# Patient Record
(demographics unavailable — no encounter records)

---

## 2025-01-06 NOTE — PHYSICAL EXAM
[Well-appearing] : well-appearing [Normocephalic] : normocephalic [No dysmorphic facial features] : no dysmorphic facial features [No ocular abnormalities] : no ocular abnormalities [Neck supple] : neck supple [Soft] : soft [No abnormal neurocutaneous stigmata or skin lesions] : no abnormal neurocutaneous stigmata or skin lesions [Straight] : straight [No deformities] : no deformities [Alert] : alert [Well related, good eye contact] : well related, good eye contact [Conversant] : conversant [Normal speech and language] : normal speech and language [Follows instructions well] : follows instructions well [VFF] : VFF [Pupils reactive to light and accommodation] : pupils reactive to light and accommodation [Full extraocular movements] : full extraocular movements [No nystagmus] : no nystagmus [Normal facial sensation to light touch] : normal facial sensation to light touch [No facial asymmetry or weakness] : no facial asymmetry or weakness [Gross hearing intact] : gross hearing intact [Equal palate elevation] : equal palate elevation [Good shoulder shrug] : good shoulder shrug [Normal tongue movement] : normal tongue movement [Midline tongue, no fasciculations] : midline tongue, no fasciculations [R handed] : R handed [Normal axial and appendicular muscle tone] : normal axial and appendicular muscle tone [Gets up on table without difficulty] : gets up on table without difficulty [No pronator drift] : no pronator drift [Normal finger tapping and fine finger movements] : normal finger tapping and fine finger movements [No abnormal involuntary movements] : no abnormal involuntary movements [5/5 strength in proximal and distal muscles of arms and legs] : 5/5 strength in proximal and distal muscles of arms and legs [Able to walk on heels] : able to walk on heels [Able to walk on toes] : able to walk on toes [2+ biceps] : 2+ biceps [Knee jerks] : knee jerks [Ankle jerks] : ankle jerks [No ankle clonus] : no ankle clonus [Good walking balance] : good walking balance [Normal gait] : normal gait [de-identified] : no resp distress, no retractions  [de-identified] : full ROM  [de-identified] : walks well [de-identified] : LT intact

## 2025-01-06 NOTE — REASON FOR VISIT
[Initial Consultation] : an initial consultation for [Parents] : parents [FreeTextEntry2] : toe-walking, cramping

## 2025-01-06 NOTE — PHYSICAL EXAM
[Well-appearing] : well-appearing [Normocephalic] : normocephalic [No dysmorphic facial features] : no dysmorphic facial features [No ocular abnormalities] : no ocular abnormalities [Neck supple] : neck supple [Soft] : soft [No abnormal neurocutaneous stigmata or skin lesions] : no abnormal neurocutaneous stigmata or skin lesions [Straight] : straight [No deformities] : no deformities [Alert] : alert [Well related, good eye contact] : well related, good eye contact [Conversant] : conversant [Normal speech and language] : normal speech and language [Follows instructions well] : follows instructions well [VFF] : VFF [Pupils reactive to light and accommodation] : pupils reactive to light and accommodation [Full extraocular movements] : full extraocular movements [No nystagmus] : no nystagmus [Normal facial sensation to light touch] : normal facial sensation to light touch [No facial asymmetry or weakness] : no facial asymmetry or weakness [Gross hearing intact] : gross hearing intact [Equal palate elevation] : equal palate elevation [Good shoulder shrug] : good shoulder shrug [Normal tongue movement] : normal tongue movement [Midline tongue, no fasciculations] : midline tongue, no fasciculations [R handed] : R handed [Normal axial and appendicular muscle tone] : normal axial and appendicular muscle tone [Gets up on table without difficulty] : gets up on table without difficulty [No pronator drift] : no pronator drift [Normal finger tapping and fine finger movements] : normal finger tapping and fine finger movements [No abnormal involuntary movements] : no abnormal involuntary movements [5/5 strength in proximal and distal muscles of arms and legs] : 5/5 strength in proximal and distal muscles of arms and legs [Able to walk on heels] : able to walk on heels [Able to walk on toes] : able to walk on toes [2+ biceps] : 2+ biceps [Knee jerks] : knee jerks [Ankle jerks] : ankle jerks [No ankle clonus] : no ankle clonus [Good walking balance] : good walking balance [Normal gait] : normal gait [de-identified] : no resp distress, no retractions  [de-identified] : full ROM  [de-identified] : walks well [de-identified] : LT intact

## 2025-01-06 NOTE — ASSESSMENT
[FreeTextEntry1] : 6 year old with intermittent toe cramping and history of toe-walking, with transient arm discomfort. Today my neurologic examination is age appropriate without evidence of focal deficits. Recommend proceeding with screening labs.

## 2025-01-06 NOTE — HISTORY OF PRESENT ILLNESS
[FreeTextEntry1] : Presenting for initial evaluation of toe-walking and cramping  Patient with long history of intermittent toe walking  - bracing not effective - last used 4 years old - would still lift heel in brace - PT  for 2 months when 4 years old, has not returned to therapies Some signs of ASD (?), never diagnosed with ASD  Patient complains of intermittent cramping in toes, seem to lock up, improved with massage No cramping throughout the rest of the legs Onset of transient discomfort in arm/shoulder the evening prior to visit

## 2025-01-06 NOTE — END OF VISIT
[Time Spent: ___ minutes] : I have spent [unfilled] minutes of time on the encounter which excludes teaching and separately reported services. independent

## 2025-01-30 NOTE — REASON FOR VISIT
[Follow Up] : a follow up visit [Patient] : patient [Parents] : parents [FreeTextEntry1] : toe walking

## 2025-01-30 NOTE — END OF VISIT
[FreeTextEntry3] : A physician assistant/resident assisted with documenting the visit and acted as a scribe. I have seen and examined the patient, made my assessment and plan and have made all modifications necessary to the note.  Monica Jordan MD Pediatric Orthopaedics Surgery [facility]

## 2025-01-30 NOTE — HISTORY OF PRESENT ILLNESS
[FreeTextEntry1] : Huey is a 6 year-old young boy returns for f/u regarding toe walking.  He was initially evaluated in our office on 23. Mom states that the child was born via  section at 39 weeks gestation.  He did not require stay in the  ICU.  He met his milestones on time and has been toe walking since he first began walking. She states that the child can come down flat-footed when prompted but almost immediately returned back up onto his toes.  She has taken Huey to a podiatrist for evaluation and custom inserts were suggested to help.  Mother states these cost $600 out-of-pocket and have not helped his gait.  The child also complained of pain in his knee/lower legs and back in the evening.  She thinks this is related to days where he is particularly active especially when he is with family who does not constantly encourage him to walk flat-footed.  She usually has to massage his legs to help make it feel better.  Mom states that the child did attend physical therapy for approximately 1 month.  The course of PT helped with stretching and massaging the lower legs and instructed the family for home exercises which they continue to do.  Of note, mother mentions that the child's cousin is autistic and also tiptoe walks.  At initial visit, jennifer's toe walking appeared habitual, and we prescribed bilateral AFOs. At last office on 24 he was not tolerating AFOs, discontinuing AFOs and verbal cues was recommended. See previous note for details.   Today patient returns for f/u evaluation of habitual toe walking. He continues to toe walk, the majority of the day. He is able to walk with a heel to toe gait when prompted, however then shortly is back up on his toes. He has also been complaining of bilateral great toe pain, worse on days where he is particularly active. Parents are also concerned that he has had muscles cramping of his upper and lower extremities. He was recently evaluated by neurology where lab work and MRI of the spine was recommended.

## 2025-01-30 NOTE — PHYSICAL EXAM
[FreeTextEntry1] : General: healthy appearing, acting appropriate for age.  HEENT: NCAT, Normal conjunctiva Cardio: Appears well perfused, no peripheral edema, brisk cap refill.  Lungs: no obvious increased WOB, no audible wheeze heard without use of stethoscope.  Abdomen: not examined.  Skin: No visible rashes on exposed skin  Focused examination of the feet and ankles: Skin is clean, dry and intact. There is no erythema, swelling or ecchymosis. Dorsiflexion to 5 degrees with the knees in extension, 10 degrees with knees in flexion bilaterally Good subtalar motion is noted bilaterally, no ankle instability Nontender to palpation over the calf musculature or Achilles tendon Sensation is intact to light touch distally. 5/5 strength in EHL/FHL/TA/GS DP 2+, brisk capillary refill less than 2 seconds in all digits

## 2025-01-30 NOTE — BIRTH HISTORY
[Non-Contributory] : Non-contributory [Duration: ___ wks] : duration: [unfilled] weeks [] :  [___ lbs.] : [unfilled] lbs [___ oz.] : [unfilled] oz. [Was child in NICU?] : Child was not in NICU [FreeTextEntry6] : hemorrhage

## 2025-01-30 NOTE — END OF VISIT
[FreeTextEntry3] : A physician assistant/resident assisted with documenting the visit and acted as a scribe. I have seen and examined the patient, made my assessment and plan and have made all modifications necessary to the note.  Monica Jordan MD Pediatric Orthopaedics Surgery [facility]

## 2025-01-30 NOTE — ASSESSMENT
[FreeTextEntry1] : Huey is a 6-year-old young man with habitual toe walking  The history was obtained today from the child and parent; given the patient's age, the history was unreliable and the parent was used as an independent historian.  Natural history of toe walking was thoroughly discussed with parents today.  I explained on clinical exam, Huey is able to achieve dorsiflexion above neutral.  He is able to walk with a heel-to-toe gait when prompted.  We discussed the different treatment modalities for toe walking.  I explained that his does not seem to be structural but more habitual in nature.  He did not tolerate the AFO braces in the past, and discussed that for children with habitual toe walking typically does not make a significant improvement. He will have MRI of the spine performed and recommended by neurology. Follow up recommended in my office after neurology follow up for further discusssion.  All questions and concerns were addressed today. Family verbalizes understanding and agree with plan of care.   I, Cecile Larsen PA-C, have acted as a scribe and documented the above information for Dr. Jordan.

## 2025-05-23 NOTE — HISTORY OF PRESENT ILLNESS
[FreeTextEntry1] : Since the last visit in Jan 2025  PCP recommending GI and Endo referral for elevated Sacc?   No cramping x 1 month Headaches 3x/week  Lasting all day frontal pain No associated photophobia/phonophobia, nausea and vomiting, dizziness Limited activity Missing school and baseball several times within the last few months No improvement with tylenol   Lifestyle Sleep: frequent night time awakenings Diet: regular meals throughout the day Hydration ~ 32 oz per day Caffeine: none Activities: baseball  Ophthalmology - within the month - allergic conjunctivitis Dental - 2 weeks ago - cavities filled

## 2025-07-03 NOTE — CONSULT LETTER
[Courtesy Letter:] : I had the pleasure of seeing your patient, [unfilled], in my office today. [Sincerely,] : Sincerely, [FreeTextEntry2] : Dr. Ribera  [address] [place] [id number] [FreeTextEntry3] : Maico Arriaza MD Chief, Pediatric Otolaryngology [facility] and Dilcia Mares [facility] Professor of Otolaryngology [facility] [employer] [facility]

## 2025-07-03 NOTE — HISTORY OF PRESENT ILLNESS
[No Personal or Family History of Easy Bruising, Bleeding, or Issues with General Anesthesia] : No Personal or Family History of easy bruising, bleeding, or issues with general anesthesia [de-identified] : 6 year old with sinus issues.   Sinus infection seen on CT scan - c/o of headache for several days  Neuro f/u for the headaches and tip toe walking - MRI performed  Pain in knees- lumbar and thoracic MRI   Treated with antibiotics  Chronic nasal congestion  Mucosal thickening on CT scan and MRI No snoring at night  No recent ear infections

## 2025-07-03 NOTE — PROCEDURE
[FreeTextEntry1] : Nasal Endoscopy [FreeTextEntry2] : Chronic Rhinitis [FreeTextEntry3] : After informed verbal consent is obtained, the fiberoptic nasal endoscope is passed via the right nasal cavity. The osteomeatal complex is clear with no polyposis or purulence. The sphenoethmoidal recess is clear with no polyposis or purulence. The choana is patent.  The fiberoptic nasal endoscope is passed via the left nasal cavity. The osteomeatal complex is clear with no polyposis or purulence. The sphenoethmoidal recess is clear with no polyposis or purulence. The choana is patent.  There is 60% obstruction of the nasopharynx with adenoid tissue.